# Patient Record
Sex: FEMALE | Race: WHITE | HISPANIC OR LATINO | Employment: FULL TIME | URBAN - METROPOLITAN AREA
[De-identification: names, ages, dates, MRNs, and addresses within clinical notes are randomized per-mention and may not be internally consistent; named-entity substitution may affect disease eponyms.]

---

## 2017-10-16 PROBLEM — R00.2 PALPITATIONS: Status: ACTIVE | Noted: 2017-10-16

## 2018-11-06 PROBLEM — R00.2 PALPITATIONS: Status: RESOLVED | Noted: 2017-10-16 | Resolved: 2018-11-06

## 2022-07-05 PROBLEM — R43.9 SMELL DISTURBANCE: Status: ACTIVE | Noted: 2022-07-05

## 2023-02-23 PROBLEM — N95.0 POSTMENOPAUSAL BLEEDING: Status: ACTIVE | Noted: 2023-02-23

## 2024-03-13 ENCOUNTER — OFFICE VISIT (OUTPATIENT)
Dept: ORTHOPEDICS | Facility: CLINIC | Age: 59
End: 2024-03-13
Payer: COMMERCIAL

## 2024-03-13 VITALS — BODY MASS INDEX: 24.92 KG/M2 | WEIGHT: 132 LBS | HEIGHT: 61 IN

## 2024-03-13 DIAGNOSIS — M15.2 DEGENERATIVE ARTHRITIS OF PROXIMAL INTERPHALANGEAL JOINT OF MIDDLE FINGER OF RIGHT HAND: Primary | ICD-10-CM

## 2024-03-13 DIAGNOSIS — M67.40 GANGLION CYST: ICD-10-CM

## 2024-03-13 PROCEDURE — 99204 OFFICE O/P NEW MOD 45 MIN: CPT | Mod: S$GLB,,, | Performed by: ORTHOPAEDIC SURGERY

## 2024-03-13 PROCEDURE — 1159F MED LIST DOCD IN RCRD: CPT | Mod: CPTII,S$GLB,, | Performed by: ORTHOPAEDIC SURGERY

## 2024-03-13 PROCEDURE — 3008F BODY MASS INDEX DOCD: CPT | Mod: CPTII,S$GLB,, | Performed by: ORTHOPAEDIC SURGERY

## 2024-03-13 PROCEDURE — 99999 PR PBB SHADOW E&M-EST. PATIENT-LVL III: CPT | Mod: PBBFAC,,, | Performed by: ORTHOPAEDIC SURGERY

## 2024-03-13 NOTE — PROGRESS NOTES
3/13/2024    Chief Complaint:  Chief Complaint   Patient presents with    Left Wrist - Pain    Right Hand - Pain    Left Hand - Pain       HPI:  Joan Sam is a 58 y.o. female, who presents to clinic today she has a history of a mass over the volar side of her.  She was only noticed this a couple of months ago.  It is not causing a significant amount of pain but she does notice some soreness in the wrist whenever she was doing yoga.  She also has complaints about some nodules over dorsum of joints on her right hand.  She is here today for evaluation and treatment options    PMHX:  Past Medical History:   Diagnosis Date    Allergy     Cardiac syncope     Cardio Inhibitrory syncope    Chicken pox     Endometriosis     Other complication of epidural anesthesia during labor and delivery     had to be reversed    Personal history of COVID-19     PMB (postmenopausal bleeding) 02/2023    Submucous leiomyoma of uterus 02/2023       PSHX:  Past Surgical History:   Procedure Laterality Date    BREAST BIOPSY Right     years ago needle    CARDIAC CATHETERIZATION      COLONOSCOPY  07/22/2021    Dr. Hamilton    CYSTOSCOPY N/A 02/23/2023    Procedure: CYSTOSCOPY;  Surgeon: Sandra Nichole MD;  Location: [facility];  Service: OB/GYN;  Laterality: N/A;    HYSTERECTOMY      OOPHORECTOMY      PELVIC LAPAROSCOPY      =for endometriosis x 2   1988 & 1996    ROBOT-ASSISTED LAPAROSCOPIC HYSTERECTOMY N/A 02/23/2023    Procedure: ROBOTIC HYSTERECTOMY;  Surgeon: Sandra Nichole MD;  Location: [facility] OR;  Service: OB/GYN;  Laterality: N/A;    ROBOT-ASSISTED LAPAROSCOPIC SALPINGO-OOPHORECTOMY Bilateral 02/23/2023    Procedure: ROBOTIC SALPINGO-OOPHORECTOMY;  Surgeon: Sandra Nichole MD;  Location: [facility] OR;  Service: OB/GYN;  Laterality: Bilateral;    TONSILLECTOMY         FMHX:  Family History   Problem Relation Age of Onset    Hyperlipidemia Mother     Heart disease Mother         cabg/3    Heart disease Father     Cancer Father   "       lung cancer    Dementia Father     Cancer Sister         breast cancer    Breast cancer Sister 47        about    Colon cancer Sister     Lymphoma Sister     Cancer Paternal Aunt         lung cancer    Cancer Paternal Uncle         lung cancer    Diabetes Maternal Grandmother     Heart disease Maternal Grandmother     Cancer Paternal Grandmother         lung cancer    Heart disease Paternal Grandfather        SOCHX:  Social History     Tobacco Use    Smoking status: Never    Smokeless tobacco: Never   Substance Use Topics    Alcohol use: No     Alcohol/week: 0.0 standard drinks of alcohol       ALLERGIES:  Pcn [penicillins], Bactrim [sulfamethoxazole-trimethoprim], Codeine, and Wound dressings    CURRENT MEDICATIONS:  Current Outpatient Medications on File Prior to Visit   Medication Sig Dispense Refill    estradioL (ESTRACE) 0.01 % (0.1 mg/gram) vaginal cream SMARTSI Gram(s) Vaginal      estradioL (VIVELLE-DOT) 0.1 mg/24 hr PTSW Place 1 patch onto the skin twice a week.      mometasone 0.1% (ELOCON) 0.1 % cream Apply topically 2 (two) times daily as needed.       No current facility-administered medications on file prior to visit.       REVIEW OF SYSTEMS:  ROS    GENERAL PHYSICAL EXAM:   Ht 5' 1" (1.549 m)   Wt 59.9 kg (132 lb)   LMP 01/10/2023 (Exact Date)   BMI 24.94 kg/m²    GEN: well developed, well nourished, no acute distress   HENT: Normocephalic, atraumatic   EYES: No discharge, conjunctiva normal   NECK: Supple, non-tender   PULM: No wheezing, no respiratory distress   CV: RRR   ABD: Soft, non-tender    ORTHO EXAM:   Examination of the left wrist and hand reveals that there is no edema.  There are no major skin changes.  He does have a small mass overlying the region of the FCR tendon distally.  This mass measures 0.5 x 1 cm in size.  It is mobile and appears to be fluid-filled.  It is pulsatile.  It was consistent with a ganglion cyst.    Examination of the right hand reveals that there are " no major skin changes.  There was no significant edema.  She does have changes consistent with arthritis throughout multiple joints.  She does have dorsal nodules overlying the PIP joints of the middle and the ring fingers.  These are only minimally tender.  They are firm and appear to be osteophyte in nature.  She does report grossly intact sensation    RADIOLOGY:   None    ASSESSMENT:   Right hand multiple interphalangeal joint arthritis, left volar wrist ganglion    PLAN:  1. I have discussed treatment options with the patient.  We have discussed aspiration of the cyst as well as surgical interventions for the cyst as well as the arthritis in the fingers.  At this time she has not having enough dysfunction or pain in either 1 of those areas to consider any significant treatment     2. Will continue to monitor her that she does have worsening of pain or enlargement of the cyst I have discussed the possibilities of aspiration versus surgical interventions.    3.  She will follow up with me as needed